# Patient Record
Sex: FEMALE | Race: WHITE | HISPANIC OR LATINO | Employment: FULL TIME | URBAN - METROPOLITAN AREA
[De-identification: names, ages, dates, MRNs, and addresses within clinical notes are randomized per-mention and may not be internally consistent; named-entity substitution may affect disease eponyms.]

---

## 2021-06-02 ENCOUNTER — RECORDS - HEALTHEAST (OUTPATIENT)
Dept: ADMINISTRATIVE | Facility: CLINIC | Age: 41
End: 2021-06-02

## 2022-10-28 ENCOUNTER — APPOINTMENT (OUTPATIENT)
Dept: ULTRASOUND IMAGING | Facility: HOSPITAL | Age: 42
End: 2022-10-28
Attending: STUDENT IN AN ORGANIZED HEALTH CARE EDUCATION/TRAINING PROGRAM

## 2022-10-28 ENCOUNTER — HOSPITAL ENCOUNTER (EMERGENCY)
Facility: HOSPITAL | Age: 42
Discharge: HOME | End: 2022-10-28
Attending: EMERGENCY MEDICINE | Admitting: EMERGENCY MEDICINE

## 2022-10-28 VITALS
TEMPERATURE: 98.7 F | SYSTOLIC BLOOD PRESSURE: 185 MMHG | WEIGHT: 280.2 LBS | HEART RATE: 81 BPM | RESPIRATION RATE: 19 BRPM | OXYGEN SATURATION: 97 % | DIASTOLIC BLOOD PRESSURE: 108 MMHG

## 2022-10-28 DIAGNOSIS — R00.2 PALPITATIONS: ICD-10-CM

## 2022-10-28 DIAGNOSIS — R25.2 LEG CRAMPING: ICD-10-CM

## 2022-10-28 LAB
ANION GAP SERPL CALCULATED.3IONS-SCNC: 12 MMOL/L (ref 7–15)
BUN SERPL-MCNC: 9.9 MG/DL (ref 6–20)
CALCIUM SERPL-MCNC: 9.2 MG/DL (ref 8.6–10)
CHLORIDE SERPL-SCNC: 99 MMOL/L (ref 98–107)
CREAT SERPL-MCNC: 0.74 MG/DL (ref 0.51–0.95)
DEPRECATED HCO3 PLAS-SCNC: 28 MMOL/L (ref 22–29)
ERYTHROCYTE [DISTWIDTH] IN BLOOD BY AUTOMATED COUNT: 13.3 % (ref 10–15)
GFR SERPL CREATININE-BSD FRML MDRD: >90 ML/MIN/1.73M2
GLUCOSE SERPL-MCNC: 91 MG/DL (ref 70–99)
HCT VFR BLD AUTO: 37.4 % (ref 35–47)
HGB BLD-MCNC: 12.1 G/DL (ref 11.7–15.7)
MAGNESIUM SERPL-MCNC: 1.9 MG/DL (ref 1.7–2.3)
MCH RBC QN AUTO: 28.1 PG (ref 26.5–33)
MCHC RBC AUTO-ENTMCNC: 32.4 G/DL (ref 31.5–36.5)
MCV RBC AUTO: 87 FL (ref 78–100)
PLATELET # BLD AUTO: 289 10E3/UL (ref 150–450)
POTASSIUM SERPL-SCNC: 3.8 MMOL/L (ref 3.4–5.3)
RBC # BLD AUTO: 4.31 10E6/UL (ref 3.8–5.2)
SODIUM SERPL-SCNC: 139 MMOL/L (ref 136–145)
TROPONIN T SERPL HS-MCNC: <6 NG/L
TSH SERPL DL<=0.005 MIU/L-ACNC: 2.69 UIU/ML (ref 0.3–4.2)
WBC # BLD AUTO: 13.9 10E3/UL (ref 4–11)

## 2022-10-28 PROCEDURE — 83735 ASSAY OF MAGNESIUM: CPT | Performed by: STUDENT IN AN ORGANIZED HEALTH CARE EDUCATION/TRAINING PROGRAM

## 2022-10-28 PROCEDURE — 36415 COLL VENOUS BLD VENIPUNCTURE: CPT | Performed by: STUDENT IN AN ORGANIZED HEALTH CARE EDUCATION/TRAINING PROGRAM

## 2022-10-28 PROCEDURE — 84443 ASSAY THYROID STIM HORMONE: CPT | Performed by: EMERGENCY MEDICINE

## 2022-10-28 PROCEDURE — 93970 EXTREMITY STUDY: CPT

## 2022-10-28 PROCEDURE — 85014 HEMATOCRIT: CPT | Performed by: STUDENT IN AN ORGANIZED HEALTH CARE EDUCATION/TRAINING PROGRAM

## 2022-10-28 PROCEDURE — 99285 EMERGENCY DEPT VISIT HI MDM: CPT | Mod: 25

## 2022-10-28 PROCEDURE — 80048 BASIC METABOLIC PNL TOTAL CA: CPT | Performed by: STUDENT IN AN ORGANIZED HEALTH CARE EDUCATION/TRAINING PROGRAM

## 2022-10-28 PROCEDURE — 93005 ELECTROCARDIOGRAM TRACING: CPT | Performed by: STUDENT IN AN ORGANIZED HEALTH CARE EDUCATION/TRAINING PROGRAM

## 2022-10-28 PROCEDURE — 84484 ASSAY OF TROPONIN QUANT: CPT | Performed by: STUDENT IN AN ORGANIZED HEALTH CARE EDUCATION/TRAINING PROGRAM

## 2022-10-28 ASSESSMENT — ACTIVITIES OF DAILY LIVING (ADL): ADLS_ACUITY_SCORE: 35

## 2022-10-28 NOTE — ED NOTES
ED Provider In Triage Note  [facility]  Encounter Date: Oct 28, 2022    No chief complaint on file.      Brief HPI:   Yadira Jaquez is a 42 year old female presenting to the Emergency Department with a chief complaint of;    Cramps in both legs, worse in the left, ongoing for a few weeks  Also feels heart fluttering at times  Seen at  Clinic, sent here without any workup    Brief Physical Exam:  There were no vitals taken for this visit.  General: Non-toxic appearing  HEENT: Atraumatic  Resp: No respiratory distress  Abdomen: Non-peritoneal  Neuro: Alert, oriented, answers questions appropriately  Psych: Behavior appropriate      Plan Initiated in Triage:  Orders Placed This Encounter     US Lower Extremity Venous Duplex Bilateral     CBC (+ platelets, no diff)     Basic metabolic panel     Magnesium     Troponin T, High Sensitivity (now)       PIT Dispo:   Return to lobby while awaiting workup and ED bed availability    Yosef Young MD on 10/28/2022 at 5:39 PM    Patient was evaluated by the Physician in Triage due to a limitation of available rooms in the Emergency Department. A plan of care was discussed based on the information obtained on the initial evaluation and patient was consuled to return back to the Emergency Department lobby after this initial evalutaiton until results were obtained or a room became available in the Emergency Department. Patient was counseled not to leave prior to receiving the results of their workup.     Yosef Young MD  [facility] EMERGENCY DEPARTMENT  [address] [place] [id number]  [phone]     Yosef Young MD  10/28/22 3202

## 2022-10-28 NOTE — ED TRIAGE NOTES
She has been getting lower leg cramps in both legs but mostly on the left leg for a few months. She also is complaining of palpitation. She is concerned her potasium is low as well. She also has SOB at times going up steps. Denies chest pain at this time. Also has upper back pain. Also has knee pain in both knees.

## 2022-10-29 NOTE — ED PROVIDER NOTES
Emergency Department Encounter     Evaluation Date & Time:   10/28/2022  6:55 PM    CHIEF COMPLAINT:  Leg Pain and Back Pain      Triage Note:She has been getting lower leg cramps in both legs but mostly on the left leg for a few months. She also is complaining of palpitation. She is concerned her potasium is low as well. She also has SOB at times going up steps. Denies chest pain at this time. Also has upper back pain. Also has knee pain in both knees.             Impression and Plan       FINAL IMPRESSION:    ICD-10-CM    1. Leg cramping  R25.2       2. Palpitations  R00.2             ED COURSE & MEDICAL DECISION MAKIN:40 PM I met with the patient, obtained history, performed an initial exam, and discussed options and plan for diagnostics and treatment here in the ED.  7:53 PM We discussed the plan for discharge and the patient is agreeable. Reviewed supportive cares, symptomatic treatment, outpatient follow up, and reasons to return to the Emergency Department. Patient to be discharged by ED RN.     42 year old female, history of PCOS, HTN, morbid obesity and thyroid disease, who presents for evaluation of BL leg cramps (L > R) that have been intermittent for the past couple of months. She also reports intermittent palpitations that occurred again today. No chest pain. She reports occasional SOB with over-exertion. She presented to clinic as she thought she might have hypokalemia and they referred her to the ED to rule out DVT.      On exam, she has RRR with clear lungs. No lower extremity edema / swelling.     EKG performed and demonstrated NSR with Q waves in lead V3 and no acute ischemic changes; troponin WNL (<6).  I do not suspect PE.    Bilateral lower extremity ultrasounds performed and negative for DVT.    Labs otherwise remarkable for leukocytosis (WBC 13.9) with no anemia.  No significant electrolyte derangements or renal impairment.  TSH WNL.    Patient discharged to home with follow-up with her  PCP.  Return precautions provided.  Patient stable throughout ED course.      At the conclusion of the encounter I discussed the results of all the tests and the disposition. The questions were answered. The patient and family acknowledged understanding and were agreeable with the care plan.      MEDICATIONS GIVEN IN THE EMERGENCY DEPARTMENT:  Medications - No data to display    NEW PRESCRIPTIONS STARTED AT TODAY'S ED VISIT:  There are no discharge medications for this patient.      HPI     HPI     Yadira Jaquez is a 42 year old female, history of PCOS, HTN, morbid obesity and thyroid disease, who presents to this ED by walk-in for evaluation of leg cramps and palpitations.     For the past couple of months, patient has had intermittent bilateral leg cramping, L > R. The cramping feels like a kena-horse. No provocative features. She presented to clinic, but they referred her to the ED to rule out DVT.     Today she also felt heart palpitations and thought that her potassium might be low. She denies associated chest pain. She reports occasional shortness of breath that occurs with over-exertion. She states that she has had intermittent palpitations for awhile now.    She notes taking Sadexa, which causes abdominal discomfort and diarrhea.     She has otherwise been in her usual state of health and denies nausea, vomiting, cough, fever or other concerns.    Last menstrual cycle was a couple weeks ago.     REVIEW OF SYSTEMS:  All other systems reviewed and are negative.      Medical History        No current outpatient medications on file.      Physical Exam     First Vitals:  Patient Vitals for the past 24 hrs:   BP Temp Temp src Pulse Resp SpO2 Weight   10/28/22 1957 -- -- -- 81 19 97 % --   10/28/22 1935 (!) 185/108 -- -- -- -- -- --   10/28/22 1740 (!) 205/105 98.7  F (37.1  C) Oral 93 20 95 % 127.1 kg (280 lb 3.3 oz)       PHYSICAL EXAM:   Physical Exam    GENERAL: Awake, alert.  In no acute distress.    HEENT: Normocephalic, atraumatic. Pupils equal, round and reactive. Conjunctiva normal.  NECK: No stridor.  PULMONARY: Symmetrical breath sounds without distress.  Lungs clear to auscultation bilaterally without wheezes, rhonchi or rales.  CARDIO: Regular rate and rhythm.  No significant murmur, rub or gallop.  Radial pulses strong and symmetrical.  ABDOMINAL: Abdomen soft, non-distended and non-tender to palpation.    EXTREMITIES: No lower extremity swelling or edema.      NEURO: Alert and oriented to person, place and time.  Cranial nerves grossly intact.  Strength 5/5 BL lower extremities (hip flexion, knee flexion / extension, plantarflexion / dorsiflexion ankles) with sensation to light touch grossly intact.   PSYCH: Normal mood and affect.  SKIN: No rashes.     Results     LAB:  All pertinent labs reviewed and interpreted  Labs Ordered and Resulted from Time of ED Arrival to Time of ED Departure   CBC WITH PLATELETS - Abnormal       Result Value    WBC Count 13.9 (*)     RBC Count 4.31      Hemoglobin 12.1      Hematocrit 37.4      MCV 87      MCH 28.1      MCHC 32.4      RDW 13.3      Platelet Count 289     BASIC METABOLIC PANEL - Normal    Sodium 139      Potassium 3.8      Chloride 99      Carbon Dioxide (CO2) 28      Anion Gap 12      Urea Nitrogen 9.9      Creatinine 0.74      Calcium 9.2      Glucose 91      GFR Estimate >90     MAGNESIUM - Normal    Magnesium 1.9     TROPONIN T, HIGH SENSITIVITY - Normal    Troponin T, High Sensitivity <6     TSH WITH FREE T4 REFLEX - Normal    TSH 2.69         RADIOLOGY:  US Lower Extremity Venous Duplex Bilateral   Final Result   IMPRESSION:   1.  No deep venous thrombosis in the bilateral lower extremities.          ECG:  10/28/2022, 17:47; NSR with rate of 94 bpm; normal intervals; normal conduction; Q waves lead III with no ST-T wave changes consistent with ACS or pericarditis; no previous EKG available for comparison    EKG independently reviewed and interpreted  by Ashley Ruiz MD    I, Kiera Macnilla, am serving as a scribe to document services personally performed by Ashley Ruiz MD based on my observation and the provider's statements to me. I, Ashley Ruiz MD attest that Kiera Mancilla is acting in a scribe capacity, has observed my performance of the services and has documented them in accordance with my direction.    Ashley Ruiz MD  Emergency Medicine  [facility] EMERGENCY DEPARTMENT         Ashley Ruiz MD  10/29/22 1127

## 2022-10-29 NOTE — ED NOTES
Patient discharged from ED. Discharge paperwork discussed, questions answered and patient agreeable about discharge and plans to follow-up with primary care.

## 2022-10-29 NOTE — DISCHARGE INSTRUCTIONS
Please follow-up with your Primary Care Provider next week for a recheck; call to arrange appointment.     Consider Holter monitoring for further evaluation of your heart palpitations - please discuss with your doctor.     Consider MRIs for further evaluation of your ongoing knee pain - please discuss with your doctor.     Return to the ER for worsening symptoms, worsening leg cramps, recurrent / worsening heart palpitations, shortness of breath, if you pass out or feel that you might, persistent nausea nausea / vomiting, fever or other concerns.